# Patient Record
Sex: FEMALE | Race: AMERICAN INDIAN OR ALASKA NATIVE
[De-identification: names, ages, dates, MRNs, and addresses within clinical notes are randomized per-mention and may not be internally consistent; named-entity substitution may affect disease eponyms.]

---

## 2021-07-24 ENCOUNTER — HOSPITAL ENCOUNTER (EMERGENCY)
Dept: HOSPITAL 5 - ED | Age: 26
Discharge: HOME | End: 2021-07-24
Payer: SELF-PAY

## 2021-07-24 VITALS — DIASTOLIC BLOOD PRESSURE: 81 MMHG | SYSTOLIC BLOOD PRESSURE: 142 MMHG
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
## 2021-07-24 DIAGNOSIS — S61.412A: Primary | ICD-10-CM
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
## 2021-07-24 DIAGNOSIS — Z79.899: ICD-10-CM
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
## 2021-07-24 PROCEDURE — 99281 EMR DPT VST MAYX REQ PHY/QHP: CPT

## 2021-07-24 NOTE — EMERGENCY DEPARTMENT REPORT
ED General Adult HPI





- General


Chief complaint: Wound/Laceration


Stated complaint: LT HAND LACERATION


Time Seen by Provider: 07/24/21 20:13


Source: patient


Mode of arrival: Ambulatory


Limitations: No Limitations





- History of Present Illness


Initial comments: 





26-year-old -American female patient presents with complaints of left 

hand laceration today.  Patient states she cut herself on the edge of a piggy 

bank.  She denies any difficulty moving her hand or loss of sensation.  She 

reports her last tetanus vaccination was 2 years ago.





Laceration repaired via sutures.  Patient tolerated procedure well without 

immediate complications.  Mupirocin given for infection prevention.  Discussed 

wound care and strict return precautions in detail with patient verbalizes 

understanding.  She is to return to the ED in 10 days for suture removal.





- Related Data


                                  Previous Rx's











 Medication  Instructions  Recorded  Last Taken  Type


 


Mupirocin [Bactroban 2% OINT] 1 applic TP TID 7 Days #1 tube 07/24/21 Unknown Rx











                                    Allergies











Allergy/AdvReac Type Severity Reaction Status Date / Time


 


No Known Allergies Allergy   Unverified 07/24/21 19:55














ED Review of Systems


ROS: 


Stated complaint: LT HAND LACERATION


Other details as noted in HPI





Musculoskeletal: denies: joint swelling, arthralgia


Neurological: denies: numbness, paresthesias





ED Past Medical Hx





- Past Medical History


Previous Medical History?: No





- Surgical History


Past Surgical History?: No





- Medications


Home Medications: 


                                Home Medications











 Medication  Instructions  Recorded  Confirmed  Last Taken  Type


 


Mupirocin [Bactroban 2% OINT] 1 applic TP TID 7 Days #1 tube 07/24/21  Unknown 

Rx














ED Physical Exam





- General


Limitations: No Limitations


General appearance: alert, in no apparent distress





- Head


Head exam: Present: atraumatic, normocephalic





- Eye


Eye exam: Present: normal appearance





- Respiratory


Respiratory exam: Absent: respiratory distress





- Cardiovascular


Cardiovascular Exam: Present: regular rate





- Neurological Exam


Neurological exam: Present: alert, oriented X3





- Psychiatric


Psychiatric exam: Present: normal affect





- Skin


Skin exam: Present: warm, dry, normal color.  Absent: intact (1.5 cm laceration 

noted overlying left third digit knuckle; normal range of motion of the fingers 

and hand noted with normal perfusion and sensation), rash





ED Course





                                   Vital Signs











  07/24/21





  19:55


 


Temperature 98 F


 


Pulse Rate 84


 


Respiratory 16





Rate 


 


Blood Pressure 142/81





[Right] 


 


O2 Sat by Pulse 98





Oximetry 














- Laceration /Wound Repair


  ** Hand


Wound Length (cm): 1


Wound's Depth, Shape: linear


Wound Explored: clean


Irrigated w/ Saline (ccs): 30


Betadine Prep?: Yes


Anesthesia: 1% Lidocaine


Volume Anesthetic (ccs): 2


Wound Repaired With: sutures


Suture Size/Type: 3:0 (At the line)


Number of Sutures: 3 (Simple interrupted)


Layer Closure?: No


Sterile Dressing Applied?: Yes


Progress: 





Minimal bleeding occurred.  Patient tolerated procedure well without any 

immediate complication.  She has full range of motion and normal perfusion of 

the hand and fingers post procedure


Critical care attestation.: 


If time is entered above; I have spent that time in minutes in the direct care 

of this critically ill patient, excluding procedure time.








ED Disposition


Clinical Impression: 


 Laceration of hand





Disposition: DC-01 TO HOME OR SELFCARE


Is pt being admited?: No


Condition: Stable


Instructions:  Sutured Wound Care, Easy-to-Read


Additional Instructions: 


Turn to the emergency department in 10 days for suture removal


Prescriptions: 


Mupirocin [Bactroban 2% OINT] 1 applic TP TID 7 Days #1 tube


Referrals: 


[facility] [Provider Group] - 3-5 Days

## 2021-08-04 ENCOUNTER — HOSPITAL ENCOUNTER (EMERGENCY)
Dept: HOSPITAL 5 - ED | Age: 26
Discharge: LEFT BEFORE DISCHARGE | End: 2021-08-04
Payer: SELF-PAY

## 2021-08-04 DIAGNOSIS — Z53.21: ICD-10-CM

## 2021-08-04 DIAGNOSIS — Z48.02: Primary | ICD-10-CM

## 2021-08-05 ENCOUNTER — HOSPITAL ENCOUNTER (EMERGENCY)
Dept: HOSPITAL 5 - ED | Age: 26
Discharge: HOME | End: 2021-08-05
Payer: SELF-PAY

## 2021-08-05 VITALS — DIASTOLIC BLOOD PRESSURE: 60 MMHG | SYSTOLIC BLOOD PRESSURE: 104 MMHG

## 2021-08-05 DIAGNOSIS — S61.412D: Primary | ICD-10-CM
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
## 2021-08-05 NOTE — EMERGENCY DEPARTMENT REPORT
Suture/Staple Removal





- HPI


Chief Complaint: Laceration/Recheck/Suture


Stated Complaint: SUTURE REMOVAL LT HAND


Time Seen by Provider: 08/05/21 11:12


When Sutures or Staples Placed: 8-10 Days Ago


Wound Location: 3rd knuckle left hand 





ED Review of Systems


ROS: 


Stated complaint: SUTURE REMOVAL LT HAND


Other details as noted in HPI





Comment: All other systems reviewed and negative


Musculoskeletal: arthralgia


Skin: other (Laceration to left third knuckle status post repair)





ED Past Medical Hx





- Past Medical History


Previous Medical History?: No





- Surgical History


Past Surgical History?: No





- Social History


Smoking Status: Never Smoker


Substance Use Type: None





- Medications


Home Medications: 


                                Home Medications











 Medication  Instructions  Recorded  Confirmed  Last Taken  Type


 


Mupirocin [Bactroban 2% OINT] 1 applic TP TID 7 Days #1 tube 07/24/21  Unknown 

Rx














Suture Removal Exam





- Exam


General: 


Vital signs noted. No distress. Alert and acting appropriately.





Wound: Yes Tenderness (Mainly around the wound which is located to the left 

third knuckle), No Pathologic Erythema, No Drainage, No Pus, No Wound Dehiscence


Other Systems: 


All other systems reviewed and are unremarkable.








ED Course


                                   Vital Signs











  08/05/21





  11:07


 


Pulse Rate 64


 


Respiratory 20





Rate 


 


Blood Pressure 104/60


 


O2 Sat by Pulse 98





Oximetry 














- Procedure Description


Procedures done: Suture removal:  Location -left third knuckle; 3 sutures noted 

and removed.  Wound healing appropriately without any signs of infection.  

Patient tolerated procedure well without any complication


Critical care attestation.: 


If time is entered above; I have spent that time in minutes in the direct care 

of this critically ill patient, excluding procedure time.








ED Disposition


Clinical Impression: 


 Encounter for removal of sutures





Disposition: DC-01 TO HOME OR SELFCARE


Is pt being admited?: No


Does the pt Need Aspirin: No


Condition: Stable


Instructions:  Wound Closure Removal, Care After


Additional Instructions: 


Continue to keep the wound clean daily with soap and water.  Dry well after each

cleaning and apply a thin layer of Neosporin to the wound.  You can take Tylenol

and ibuprofen as needed for pain.  if you continue to have discomfort in that 

area in about 2 weeks I recommend that you follow-up with the local hand 

surgeon.  Return to the ER if anything changes or worsens in any way.


Referrals: 


[facility]ARE,MD [Primary Care Provider] - 3-5 Days


Time of Disposition: 11:21